# Patient Record
Sex: FEMALE | Race: ASIAN | NOT HISPANIC OR LATINO | URBAN - METROPOLITAN AREA
[De-identification: names, ages, dates, MRNs, and addresses within clinical notes are randomized per-mention and may not be internally consistent; named-entity substitution may affect disease eponyms.]

---

## 2019-09-30 PROBLEM — 82934008: Status: ACTIVE | Noted: 2019-09-30

## 2019-09-30 PROBLEM — 15627741000119108: Status: ACTIVE | Noted: 2019-09-30

## 2019-09-30 PROBLEM — 79922009: Status: ACTIVE | Noted: 2019-09-30

## 2019-09-30 PROBLEM — 16331000: Status: ACTIVE | Noted: 2019-09-30

## 2020-09-08 ENCOUNTER — OFFICE VISIT (OUTPATIENT)
Dept: URBAN - METROPOLITAN AREA CLINIC 37 | Facility: CLINIC | Age: 25
End: 2020-09-08

## 2020-09-08 VITALS — HEIGHT: 62 IN | BODY MASS INDEX: 16.56 KG/M2 | WEIGHT: 90 LBS

## 2020-09-08 RX ORDER — OMEPRAZOLE 40 MG/1
1 CAPSULE BEFORE BREAKFAST CAPSULE, DELAYED RELEASE ORAL ONCE A DAY
Qty: 90 | Refills: 0 | Status: ON HOLD | COMMUNITY
Start: 2020-03-11

## 2020-09-08 RX ORDER — LACTULOSE 10 G/15ML
15 ML SOLUTION ORAL ONCE A DAY
Qty: 450 | Status: ON HOLD | COMMUNITY

## 2020-09-08 RX ORDER — LORATADINE 10 MG/1
1 TABLET TABLET ORAL PRN
Status: ON HOLD | COMMUNITY

## 2020-09-08 RX ORDER — FLUTICASONE PROPIONATE 50 UG/1
1 SPRAY IN EACH NOSTRIL SPRAY, METERED NASAL PRN
Status: ON HOLD | COMMUNITY

## 2020-09-08 RX ORDER — SUCRALFATE 1 G/1
1 TABLET BEFORE MEALS TABLET ORAL TWICE A DAY
Qty: 60 | Refills: 0 | Status: ON HOLD | COMMUNITY
Start: 2020-03-11

## 2020-09-08 RX ORDER — DOCUSATE SODIUM 100 MG/1
1 CAPSULE CAPSULE ORAL PRN
Status: ON HOLD | COMMUNITY

## 2020-09-08 RX ORDER — SODIUM SULFATE, POTASSIUM SULFATE, MAGNESIUM SULFATE 17.5; 3.13; 1.6 G/ML; G/ML; G/ML
AS DIRECTED SOLUTION, CONCENTRATE ORAL ONCE
Qty: 1 KIT | Refills: 0

## 2020-09-08 RX ORDER — POLYETHYLENE GLYCOL 3350 17 G/17G
1 PACKET MIXED WITH 8 OUNCES OF FLUID POWDER, FOR SOLUTION ORAL ONCE A DAY
Status: ACTIVE | COMMUNITY

## 2020-09-08 RX ORDER — OMEPRAZOLE 40 MG/1
1 CAPSULE BEFORE BREAKFAST CAPSULE, DELAYED RELEASE ORAL ONCE A DAY
Qty: 90 | Refills: 1

## 2020-09-08 NOTE — HPI-MIGRATED HPI
Acute visit : Patient is here for an acute visit -> Abdominal pain and Heartburn Patient has a personal history of gastritis She had EGD on 02/24/2020, esophagus appeared normal. The GE junction was irregular. Esophageal bxx showed squamous epithelium with no significant histopathologic change. Mild gastritis was noted in the gastric body and antrum. 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 No H. pylori or IM identified. Normal duodenum. Due to persistent epigastric pain and heartburn despite of being on PPI 40 mg daily, she was scheduled for Colonoscopy in March 2020 but was cancelled due to [facility] closed during that time ( amid COVID-19) Patient was prescribed Omeprazole 40 mg daily + Carafte BID in the mean time.  However, patient is here due to recurrence in symptoms: pain about 1 inch above the umbilicus Yesterday she has 2 loose stools She has weight loss since last visit  ;

## 2020-09-14 ENCOUNTER — OFFICE VISIT (OUTPATIENT)
Dept: URBAN - METROPOLITAN AREA MEDICAL CENTER 10 | Facility: MEDICAL CENTER | Age: 25
End: 2020-09-14

## 2020-09-28 ENCOUNTER — OFFICE VISIT (OUTPATIENT)
Dept: URBAN - METROPOLITAN AREA CLINIC 37 | Facility: CLINIC | Age: 25
End: 2020-09-28

## 2020-09-28 VITALS — WEIGHT: 98 LBS | HEIGHT: 62 IN | BODY MASS INDEX: 18.03 KG/M2

## 2020-09-28 PROBLEM — 89362005: Status: ACTIVE | Noted: 2020-09-08

## 2020-09-28 PROBLEM — 8493009: Status: ACTIVE | Noted: 2020-03-10

## 2020-09-28 PROBLEM — 102614006: Status: ACTIVE | Noted: 2020-03-11

## 2020-09-28 RX ORDER — OMEPRAZOLE 40 MG/1
1 CAPSULE BEFORE BREAKFAST CAPSULE, DELAYED RELEASE ORAL ONCE A DAY
Qty: 90 | Refills: 1

## 2020-09-28 RX ORDER — POLYETHYLENE GLYCOL 3350 17 G/17G
1 PACKET MIXED WITH 8 OUNCES OF FLUID POWDER, FOR SOLUTION ORAL ONCE A DAY
Status: ACTIVE | COMMUNITY

## 2020-09-28 RX ORDER — LORATADINE 10 MG/1
1 TABLET TABLET ORAL PRN
Status: ON HOLD | COMMUNITY

## 2020-09-28 RX ORDER — SODIUM SULFATE, POTASSIUM SULFATE, MAGNESIUM SULFATE 17.5; 3.13; 1.6 G/ML; G/ML; G/ML
AS DIRECTED SOLUTION, CONCENTRATE ORAL ONCE
Qty: 1 KIT | Refills: 0 | Status: ON HOLD | COMMUNITY

## 2020-09-28 RX ORDER — OMEPRAZOLE 40 MG/1
1 CAPSULE BEFORE BREAKFAST CAPSULE, DELAYED RELEASE ORAL ONCE A DAY
Qty: 90 | Refills: 1 | Status: ON HOLD | COMMUNITY

## 2020-09-28 RX ORDER — LACTULOSE 10 G/15ML
15 ML SOLUTION ORAL ONCE A DAY
Qty: 450 | Status: ON HOLD | COMMUNITY

## 2020-09-28 RX ORDER — DOCUSATE SODIUM 100 MG/1
1 CAPSULE CAPSULE ORAL PRN
Status: ON HOLD | COMMUNITY

## 2020-09-28 RX ORDER — FLUTICASONE PROPIONATE 50 UG/1
1 SPRAY IN EACH NOSTRIL SPRAY, METERED NASAL PRN
Status: ON HOLD | COMMUNITY

## 2020-09-28 RX ORDER — SUCRALFATE 1 G/1
1 TABLET BEFORE MEALS TABLET ORAL TWICE A DAY
Qty: 60 | Refills: 0 | Status: ON HOLD | COMMUNITY
Start: 2020-03-11

## 2020-09-28 NOTE — HPI-MIGRATED HPI
Follow up OV : Last OV was -> 2 weeks ago Last EGD done 02/2020, chronic gastritis no evidence of H. plyori;   Follow up OV : Patient is on -> Omeprazole 40 mg daily;   Follow up OV : Current symptoms are -> improving ;   Follow up OV : Patient is here for a routine OV for -> Gastritis;   Post-op OV : After Colonoscopy on -> 09/14/2020, No polyps were detected. The terminal ileum appeared normal. The entire colon appeared normal with bxx showing no diagnostic histopathologic change, no evidence of acute, chronic or microscopic colitis. Good quality bowel prep.  This procedure was done for GI pathology due to abdominal pain;   Post-op OV : Patient denies -> rectal bleeding, fever, nausea & vomiting since the procedure date;   Post-op OV : Patient -> denies any new changes in his/her health status since last OV. Current BM: 1 BM every 2-3 days  while taking Miralx 1 daily  ;

## 2020-10-12 ENCOUNTER — OFFICE VISIT (OUTPATIENT)
Dept: URBAN - METROPOLITAN AREA MEDICAL CENTER 10 | Facility: MEDICAL CENTER | Age: 25
End: 2020-10-12

## 2020-10-26 ENCOUNTER — OFFICE VISIT (OUTPATIENT)
Dept: URBAN - METROPOLITAN AREA CLINIC 37 | Facility: CLINIC | Age: 25
End: 2020-10-26

## 2020-12-04 ENCOUNTER — DASHBOARD ENCOUNTERS (OUTPATIENT)
Age: 25
End: 2020-12-04

## 2020-12-08 ENCOUNTER — OFFICE VISIT (OUTPATIENT)
Dept: URBAN - METROPOLITAN AREA CLINIC 37 | Facility: CLINIC | Age: 25
End: 2020-12-08

## 2020-12-08 VITALS — BODY MASS INDEX: 17.48 KG/M2 | HEIGHT: 62 IN | WEIGHT: 95 LBS

## 2020-12-08 RX ORDER — OMEPRAZOLE 40 MG/1
1 CAPSULE BEFORE BREAKFAST CAPSULE, DELAYED RELEASE ORAL ONCE A DAY
Qty: 90 | Refills: 1 | OUTPATIENT

## 2020-12-08 RX ORDER — SUCRALFATE 1 G/1
1 TABLET BEFORE MEALS TABLET ORAL TWICE A DAY
Qty: 60 | Refills: 0 | Status: ON HOLD | COMMUNITY
Start: 2020-03-11

## 2020-12-08 RX ORDER — FLUTICASONE PROPIONATE 50 UG/1
1 SPRAY IN EACH NOSTRIL SPRAY, METERED NASAL PRN
Status: ON HOLD | COMMUNITY

## 2020-12-08 RX ORDER — DOCUSATE SODIUM 100 MG/1
1 CAPSULE CAPSULE ORAL PRN
Status: ACTIVE | COMMUNITY

## 2020-12-08 RX ORDER — LACTULOSE 10 G/15ML
15 ML SOLUTION ORAL ONCE A DAY
Qty: 450 | Status: ON HOLD | COMMUNITY

## 2020-12-08 RX ORDER — POLYETHYLENE GLYCOL 3350 17 G/17G
1 PACKET MIXED WITH 8 OUNCES OF FLUID POWDER, FOR SOLUTION ORAL ONCE A DAY
Status: ACTIVE | COMMUNITY

## 2020-12-08 RX ORDER — OMEPRAZOLE 40 MG/1
1 CAPSULE BEFORE BREAKFAST CAPSULE, DELAYED RELEASE ORAL ONCE A DAY
Qty: 90 | Refills: 1 | Status: ACTIVE | COMMUNITY

## 2020-12-08 RX ORDER — LORATADINE 10 MG/1
1 TABLET TABLET ORAL PRN
Status: ACTIVE | COMMUNITY

## 2020-12-08 RX ORDER — VENLAFAXINE HYDROCHLORIDE 37.5 MG/1
1 TABLET WITH FOOD TABLET ORAL ONCE A DAY
Qty: 30 | Status: ACTIVE | COMMUNITY
Start: 2020-11-24

## 2020-12-08 NOTE — HPI-MIGRATED HPI
Follow up OV : Patient is here for a routine OV for -> Gastritis/Abdominal pain;   Follow up OV : Last OV was -> 2 months ago Abdominal Pain  Last Colonoscopy on 09/14/2020, no polyps were detected. No significant histopathology.  Patient was prescribed with Miralax BID and Colace 100mg BID.  Current BM: one soft BM daily  Gastritis:  Last EGD done 02/2020 showed chronic gastritis, no evidence of H.pylori.  Patient continued taking Omeprazole 40mg  Current Symptoms: feeling improving while taking Omeprazole She admits she has tension conflict with her dad and it caused her a lot of stress. She does not eat much due to being depressed and loss of appetite She was prescribed antidepressant by PCP but does not feel any better ;